# Patient Record
Sex: MALE | Race: OTHER | Employment: STUDENT | URBAN - METROPOLITAN AREA
[De-identification: names, ages, dates, MRNs, and addresses within clinical notes are randomized per-mention and may not be internally consistent; named-entity substitution may affect disease eponyms.]

---

## 2024-10-01 ENCOUNTER — HOSPITAL ENCOUNTER (EMERGENCY)
Facility: HOSPITAL | Age: 19
Discharge: HOME | End: 2024-10-01
Attending: PEDIATRICS
Payer: COMMERCIAL

## 2024-10-01 VITALS
DIASTOLIC BLOOD PRESSURE: 77 MMHG | HEART RATE: 114 BPM | OXYGEN SATURATION: 98 % | TEMPERATURE: 99 F | WEIGHT: 163.81 LBS | SYSTOLIC BLOOD PRESSURE: 171 MMHG | RESPIRATION RATE: 18 BRPM

## 2024-10-01 DIAGNOSIS — L02.91 ABSCESS: Primary | ICD-10-CM

## 2024-10-01 PROCEDURE — 87070 CULTURE OTHR SPECIMN AEROBIC: CPT | Performed by: PEDIATRICS

## 2024-10-01 PROCEDURE — 87077 CULTURE AEROBIC IDENTIFY: CPT | Performed by: PEDIATRICS

## 2024-10-01 PROCEDURE — 87186 SC STD MICRODIL/AGAR DIL: CPT | Performed by: PEDIATRICS

## 2024-10-01 PROCEDURE — 99284 EMERGENCY DEPT VISIT MOD MDM: CPT

## 2024-10-01 PROCEDURE — 87205 SMEAR GRAM STAIN: CPT | Performed by: PEDIATRICS

## 2024-10-01 PROCEDURE — 10061 I&D ABSCESS COMP/MULTIPLE: CPT

## 2024-10-01 RX ORDER — CLINDAMYCIN HCL 150 MG
450 CAPSULE ORAL ONCE
Status: COMPLETED | OUTPATIENT
Start: 2024-10-01 | End: 2024-10-01

## 2024-10-01 RX ORDER — ALBUTEROL SULFATE 90 UG/1
2 INHALANT RESPIRATORY (INHALATION) AS NEEDED
COMMUNITY

## 2024-10-01 RX ORDER — CLINDAMYCIN HCL 300 MG
600 CAPSULE ORAL 3 TIMES DAILY
Qty: 60 CAPSULE | Refills: 0 | Status: SHIPPED | OUTPATIENT
Start: 2024-10-01 | End: 2024-10-11

## 2024-10-01 NOTE — ED PROVIDER NOTES
Patient Seen in: [facility] Emergency Department      History     Chief Complaint   Patient presents with    Pain    Eval-G     Stated Complaint: left buttock pain, ems was called by campus security- evasive about questions    Subjective:   HPI    Patient is a 19-year-old male presenting the ED for complaint of a abscess on his left buttock.  He states has been there about a week.  He states that there is been a little bit of drainage.  He denies fever.  He thinks it has been growing.  He arrives to the ED very anxious pacing saying that his heart is beating fast.    Objective:     Past Medical History:    Asthma (HCC)              History reviewed. No pertinent surgical history.             Social History     Socioeconomic History    Marital status: Single     Social Determinants of Health     Food Insecurity: No Food Insecurity (6/18/2024)    Received from [facility]    Food Insecurity     Currently or in the past 3 months, have you worried your food would run out before you had money to buy more?: No     In the past 12 months, have you run out of food or been unable to get more?: No   Transportation Needs: No Transportation Needs (6/18/2024)    Received from [facility]    Transportation Needs     Medical Transportation Needs?: No    Received from [facility]    Housing Stability              Physical Exam     ED Triage Vitals [10/01/24 1616]   BP (!) 171/77   Pulse 114   Resp 18   Temp 99.2 °F (37.3 °C)   Temp src Temporal   SpO2 98 %   O2 Device None (Room air)       Current Vitals:   Vital Signs  BP: (!) 171/77  Pulse: 114  Resp: 18  Temp: 99.2 °F (37.3 °C)  Temp src: Temporal  MAP (mmHg): (!) 108    Oxygen Therapy  SpO2: 98 %  O2 Device: None (Room air)        Physical Exam  HEENT: The pupils are equal round and react to light, oropharynx is clear, mucous membranes are moist.  Ears:left TM shows no erythema, right TM shows no erythema   Neck:  Supple, full range of motion.  CV: Chest is clear to auscultation, no wheezes rales or rhonchi.  Cardiac exam normal S1-S2, no murmurs rubs or gallops.  Abdomen: Soft, nontender, nondistended.  Bowel sounds present throughout.  Extremities: Warm and well perfused.  Dermatologic exam: 3 x 3 cm raised erythematous abscess to the left mid buttock.  No drainage.  Neurologic exam: Cranial nerves 2-12 grossly intact.    Orthopedic exam: normal,from.    ED Course     Labs Reviewed   AEROBIC BACTERIAL CULTURE            Radiology:  Imaging ordered independently visualized and interpreted by myself (along with review of radiologist's interpretation) and noted the following: None    No results found.    Labs:  ^^ Personally ordered, reviewed, and interpreted all unique tests ordered.  Clinically significant labs noted: Aerobic culture and Gram stain pending    Medications administered:  Medications   clindamycin (Cleocin) cap 450 mg (450 mg Oral Given 10/1/24 1755)       Pulse oximetry:  Pulse oximetry on room air is 98% and is normal.     Cardiac monitoring:  Initial heart rate is 114 and is normal for age.  Blood pressure elevated likely due to anxiety    Vital signs:  Vitals:    10/01/24 1616   BP: (!) 171/77   Pulse: 114   Resp: 18   Temp: 99.2 °F (37.3 °C)   TempSrc: Temporal   SpO2: 98%   Weight: 74.3 kg       Chart review:  ^^ Review of prior external notes from unique sources (non-[facility] ED records): noted in history previous visits for preventative care and drug refills from [facility] [place].         MDM      Patient presents with abscess to the left buttock.  No evidence to suggest cellulitis or multifocal area of abscess is considered on the differential.    Patient's wound was cleaned.  It was directly injected with lidocaine with epinephrine and then vigorously cleaned again.  It was incised with a 11 blade scalpel.  The area was opened and pus was expressed some of which was sent for culture and Gram  stain.  Loculations were opened with a hemostat.  The area was then bandaged.    Patient received oral antibiotics in the ED.    Medical Decision Making  Patient will follow with the PMD and return to the ED for worsening of symptoms    Patient was screened and evaluated during this visit.   As a treating physician attending to the patient, I determined, within reasonable clinical confidence and prior to discharge, that an emergency medical condition was not or was no longer present.  There was no indication for further evaluation, treatment or admission on an emergency basis.  Comprehensive verbal and written discharge and follow-up instructions were provided to help prevent relapse or worsening.  Patient was instructed to follow-up with the primary care provider for further evaluation and treatment, but to return immediately to the ER for worsening, concerning, new, changing or persisting symptoms.  I discussed the case with the patient/parent and they had no questions, complaints, or concerns.  Patient/parent felt comfortable going home.    Disposition and Plan     Clinical Impression:  1. Abscess         Disposition:  Discharge  10/1/2024  6:05 pm    Follow-up:  No follow-up provider specified.        Medications Prescribed:  Discharge Medication List as of 10/1/2024  6:06 PM        START taking these medications    Details   clindamycin 300 MG Oral Cap Take 2 capsules (600 mg total) by mouth 3 (three) times daily for 10 days., Normal, Disp-60 capsule, R-0                 Supplementary Documentation:

## 2024-10-01 NOTE — ED INITIAL ASSESSMENT (HPI)
Pt to ER c/o boil to left side of buttocks x 1 week. Pt states he feels a \"pulsating burn\" on the boil. Pt states boil has been growing in size over the last week

## 2024-10-06 ENCOUNTER — HOSPITAL ENCOUNTER (EMERGENCY)
Facility: HOSPITAL | Age: 19
Discharge: HOME | End: 2024-10-06
Attending: EMERGENCY MEDICINE
Payer: COMMERCIAL

## 2024-10-06 VITALS
HEIGHT: 73.62 IN | HEART RATE: 92 BPM | RESPIRATION RATE: 20 BRPM | BODY MASS INDEX: 21.4 KG/M2 | TEMPERATURE: 100 F | DIASTOLIC BLOOD PRESSURE: 80 MMHG | OXYGEN SATURATION: 100 % | WEIGHT: 165 LBS | SYSTOLIC BLOOD PRESSURE: 105 MMHG

## 2024-10-06 DIAGNOSIS — Z51.89 WOUND CHECK, ABSCESS: Primary | ICD-10-CM

## 2024-10-06 PROCEDURE — 99283 EMERGENCY DEPT VISIT LOW MDM: CPT

## 2024-10-06 RX ORDER — MUPIROCIN 20 MG/G
1 OINTMENT TOPICAL 3 TIMES DAILY
Qty: 1 EACH | Refills: 0 | Status: SHIPPED | OUTPATIENT
Start: 2024-10-06 | End: 2024-10-20

## 2024-10-06 NOTE — DISCHARGE INSTRUCTIONS
Continue good wound care  Topical antibiotic ointment  Finish the oral antibiotic    Contact your family doctor or the primary care doctor on-call Monday morning to arrange wound check later this week    Return for any concerning symptoms

## 2024-10-06 NOTE — ED INITIAL ASSESSMENT (HPI)
Pt states that he tripped and fell one week ago. Pt states that they he landed on his butt and c/o of a laceration to the left side of his buttocks.

## 2024-10-06 NOTE — ED PROVIDER NOTES
Patient Seen in: [facility] Emergency Department      History     Chief Complaint   Patient presents with    Laceration/Abrasion     Stated Complaint: \"CUT TO LOWER SIDE OF LEFT BACK..\"    Subjective:   HPI    Patient was to the emergency department 5 days ago.  Patient was complaining of an abscess on his left buttock.  He stated it had been there for a week and he noted some drainage.  According to the ER note, the abscess was incised and drained.  Culture was sent.  The wound was dressed and bandaged.  Patient started on clindamycin antibiotic.    Patient tells me that he is here today for a cut on his buttock.  Patient states that he fell about a week ago while on duty at work when he slipped on water.  He declined answering the question where he was working.  He does relate that he was in the emergency department and an incision and drainage was performed.  Patient reports that he believes the wound may be infected and it is red and there is drainage.  Patient denies any complaints.  There is no fever.  No chills.  No night sweats.  No nausea or vomiting.  No headache.  No chest, back, or abdominal pain.    Objective:     Past Medical History:    Asthma (HCC)              History reviewed. No pertinent surgical history.             Social History     Socioeconomic History    Marital status: Single   Tobacco Use    Smoking status: Never    Smokeless tobacco: Never   Vaping Use    Vaping status: Never Used   Substance and Sexual Activity    Alcohol use: Never    Drug use: Never     Social Determinants of Health     Food Insecurity: No Food Insecurity (6/18/2024)    Received from [facility]    Food Insecurity     Currently or in the past 3 months, have you worried your food would run out before you had money to buy more?: No     In the past 12 months, have you run out of food or been unable to get more?: No   Transportation Needs: No Transportation Needs (6/18/2024)    Received from [facility]    Transportation Needs     Medical Transportation Needs?: No    Received from [facility]    Housing Stability                  Physical Exam     ED Triage Vitals [10/06/24 1102]   /80   Pulse 92   Resp 20   Temp 99.7 °F (37.6 °C)   Temp src Temporal   SpO2 100 %   O2 Device None (Room air)       Current Vitals:   Vital Signs  BP: 105/80  Pulse: 92  Resp: 20  Temp: 99.7 °F (37.6 °C)  Temp src: Temporal    Oxygen Therapy  SpO2: 100 %  O2 Device: None (Room air)        Physical Exam  Patient was refusing to get into a gown.  He was dressed in a hooded sweatshirt, T-shirt, and sweatpants.  He would not allow any assessment other than pulling his pants down on the left side to reveal his buttock.  The left buttock is remarkable for what appears to be a healing wound.  There is a 2-3mm diameter area that has some yellowish crusting.  With gentle palpation, I feel a vague fullness in this area but definitely no fluctuance.  There is no purulent discharge from the wound with palpation.  Patient would not allow me to probe the wound with a cotton tip applicator to ensure there was no deeper space abscess remaining.  There is no proximal lymphangitis.  The buttock was otherwise unremarkable      ED Course   Labs Reviewed - No data to display       Culture from 5 days ago:  Staphylococcus aureus      Not Specified    Cefazolin  Sensitive    Clindamycin <=0.25 Sensitive    Erythromycin <=0.25 Sensitive    Gentamicin <=0.5 Sensitive    Levofloxacin 0.5 Sensitive    Oxacillin 0.5 Sensitive    Trimethoprim/Sulfa <=10 Sensitive    Vancomycin 1 Sensitive           MDM      Patient appears to have a healing abscess incision and drainage site on the buttock.  I see no significant surrounding erythema to suggest cellulitis.  There is a vague fullness which is probably more just inflammation rather than abscess cavity although as noted above, patient would not allow any probing of the  wound.   As noted above, Staph aureus was sensitive to all including the clindamycin that patient had been discharged on.  Patient inquired about proper wound care.  I discussed with him my recommendations for wound care including cleansing the area once or twice a day and applying topical antibiotic ointment.  I also recommended a wound check with his primary care doctor or the primary care doctor on-call in a few days to ensure the wound continues to heal.  He was advised to call the doctor on Monday for an appointment to be seen.  Patient did fill the prescription for antibiotic and still has 5 days worth of the clindamycin antibiotic to finish.  I encouraged him to do so.                          Medical Decision Making      Disposition and Plan     Clinical Impression:  1. Wound check, abscess         Disposition:  Discharge  10/6/2024 11:32 am    Follow-up:  Leisa Hyatt DO  [address] [place] [id number]  [phone]    Call in 1 day(s)  For wound re-check later this week          Medications Prescribed:  Current Discharge Medication List        START taking these medications    Details   mupirocin 2 % External Ointment Apply 1 Application topically 3 (three) times daily for 14 days.  Qty: 1 each, Refills: 0                 Supplementary Documentation: